# Patient Record
Sex: MALE | Race: WHITE | Employment: STUDENT | URBAN - NONMETROPOLITAN AREA
[De-identification: names, ages, dates, MRNs, and addresses within clinical notes are randomized per-mention and may not be internally consistent; named-entity substitution may affect disease eponyms.]

---
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
## 2022-07-20 ENCOUNTER — OFFICE VISIT (OUTPATIENT)
Dept: FAMILY MEDICINE CLINIC | Age: 22
End: 2022-07-20
Payer: COMMERCIAL

## 2022-07-20 VITALS
SYSTOLIC BLOOD PRESSURE: 124 MMHG | OXYGEN SATURATION: 96 % | HEIGHT: 74 IN | TEMPERATURE: 98.1 F | WEIGHT: 160.8 LBS | BODY MASS INDEX: 20.64 KG/M2 | DIASTOLIC BLOOD PRESSURE: 62 MMHG | RESPIRATION RATE: 18 BRPM | HEART RATE: 59 BPM

## 2022-07-20 DIAGNOSIS — F41.1 GAD (GENERALIZED ANXIETY DISORDER): Primary | ICD-10-CM

## 2022-07-20 DIAGNOSIS — R45.82 FEELING WORRIED: ICD-10-CM

## 2022-07-20 DIAGNOSIS — F34.1 DYSTHYMIA: ICD-10-CM

## 2022-07-20 PROCEDURE — 99214 OFFICE O/P EST MOD 30 MIN: CPT | Performed by: NURSE PRACTITIONER

## 2022-07-20 RX ORDER — PAROXETINE 10 MG/1
10 TABLET, FILM COATED ORAL DAILY
Qty: 30 TABLET | Refills: 3 | Status: SHIPPED | OUTPATIENT
Start: 2022-07-20 | End: 2022-08-17 | Stop reason: SDUPTHER

## 2022-07-20 ASSESSMENT — PATIENT HEALTH QUESTIONNAIRE - PHQ9
SUM OF ALL RESPONSES TO PHQ QUESTIONS 1-9: 11
5. POOR APPETITE OR OVEREATING: 3
2. FEELING DOWN, DEPRESSED OR HOPELESS: 0
SUM OF ALL RESPONSES TO PHQ QUESTIONS 1-9: 11
SUM OF ALL RESPONSES TO PHQ QUESTIONS 1-9: 11
8. MOVING OR SPEAKING SO SLOWLY THAT OTHER PEOPLE COULD HAVE NOTICED. OR THE OPPOSITE, BEING SO FIGETY OR RESTLESS THAT YOU HAVE BEEN MOVING AROUND A LOT MORE THAN USUAL: 0
10. IF YOU CHECKED OFF ANY PROBLEMS, HOW DIFFICULT HAVE THESE PROBLEMS MADE IT FOR YOU TO DO YOUR WORK, TAKE CARE OF THINGS AT HOME, OR GET ALONG WITH OTHER PEOPLE: 2
1. LITTLE INTEREST OR PLEASURE IN DOING THINGS: 3
6. FEELING BAD ABOUT YOURSELF - OR THAT YOU ARE A FAILURE OR HAVE LET YOURSELF OR YOUR FAMILY DOWN: 1
9. THOUGHTS THAT YOU WOULD BE BETTER OFF DEAD, OR OF HURTING YOURSELF: 0
4. FEELING TIRED OR HAVING LITTLE ENERGY: 2
3. TROUBLE FALLING OR STAYING ASLEEP: 2
7. TROUBLE CONCENTRATING ON THINGS, SUCH AS READING THE NEWSPAPER OR WATCHING TELEVISION: 0
SUM OF ALL RESPONSES TO PHQ9 QUESTIONS 1 & 2: 3
SUM OF ALL RESPONSES TO PHQ QUESTIONS 1-9: 11

## 2022-07-20 NOTE — PROGRESS NOTES
Dee Dee Murillo is a 23 y.o. male that presents for Follow-up (Uneasy feeling, emotional, trouble sleeping)      Anxiety     HPI:    Inciting events or triggers for anxiety - daily life events    Frequency of anxiety - daily  Panic attacks? No  Symptoms of panic attacks -   n/a  Sleep Disturbances? Yes - when he feels really worried   Impaired concentration? Yes - some days when anxiety is high  Substance abuse? No  Suicidal/Homicidal Ideation? No    Compliant with meds: has never been on  meds before   Med side effects: No    Sees therapist?: No  Family History of Mental Illness? No    Depressed Mood    HPI:    Depressed Mood? yes - he describes his mood as feeling worried a lot, thinks about things that may go wrong,   Anhedonia? yes - some days   Appetite changes?  no  Sleep disturbances? yes - as above when he is really worrried, his mother notices he has changed not himself not having fun and enjoying things like he used to. Feelings of guilt? yes -   Decreased energy? yes - some days   Impaired concentration? no  Substance abuse? no    Suicidal/Homicidal Ideation? no      Compliant with meds: Yes has never been on meds before   Med side effects: no   Sees therapist?:  no  Family History of Mental Illness?  no    Review of Systems - Psychological ROS: positive for - anxiety, concentration difficulties, depression, and obsessive thoughts, negative for - suicidal ideation     Physical Exam    /62   Pulse 59   Temp 98.1 °F (36.7 °C)   Resp 18   Ht 6' 1.5\" (1.867 m)   Wt 160 lb 12.8 oz (72.9 kg)   SpO2 96%   BMI 20.93 kg/m²   Appearance: alert, well appearing, and in no distress, normal appearing weight and well hydrated. Skin exam - normal coloration and turgor, no rashes, no suspicious skin lesions noted. Psych:  Affect appropriate. Thought process is normal without evidence of depression or psychosis. Good insight and appropriae interaction.   Cognition and memory appear to be intact. ASSESSMENT & PLAN  George Bender was seen today for follow-up. Diagnoses and all orders for this visit:    BERNARDO (generalized anxiety disorder)  Start paxil  Pt declines counseling  Long discussion spent with pt over 50% of visit time, which was 25 minutes   Feeling worried  As above   Dysthymia    Other orders  -     PARoxetine (PAXIL) 10 MG tablet; Take 1 tablet by mouth in the morning. Pt in agreement with plan     Return in about 4 weeks (around 8/17/2022) for f/u new med.

## 2022-08-17 ENCOUNTER — OFFICE VISIT (OUTPATIENT)
Dept: FAMILY MEDICINE CLINIC | Age: 22
End: 2022-08-17
Payer: COMMERCIAL

## 2022-08-17 VITALS
WEIGHT: 154.2 LBS | SYSTOLIC BLOOD PRESSURE: 118 MMHG | OXYGEN SATURATION: 99 % | TEMPERATURE: 98.4 F | HEIGHT: 74 IN | DIASTOLIC BLOOD PRESSURE: 68 MMHG | RESPIRATION RATE: 16 BRPM | HEART RATE: 56 BPM | BODY MASS INDEX: 19.79 KG/M2

## 2022-08-17 DIAGNOSIS — F51.04 PSYCHOPHYSIOLOGICAL INSOMNIA: ICD-10-CM

## 2022-08-17 DIAGNOSIS — F41.1 GAD (GENERALIZED ANXIETY DISORDER): Primary | ICD-10-CM

## 2022-08-17 DIAGNOSIS — Z11.59 ENCOUNTER FOR HEPATITIS C SCREENING TEST FOR LOW RISK PATIENT: ICD-10-CM

## 2022-08-17 DIAGNOSIS — Z11.4 SCREENING FOR HIV (HUMAN IMMUNODEFICIENCY VIRUS): ICD-10-CM

## 2022-08-17 PROCEDURE — 99213 OFFICE O/P EST LOW 20 MIN: CPT | Performed by: NURSE PRACTITIONER

## 2022-08-17 RX ORDER — PAROXETINE HYDROCHLORIDE 20 MG/1
20 TABLET, FILM COATED ORAL DAILY
Qty: 30 TABLET | Refills: 2 | Status: SHIPPED | OUTPATIENT
Start: 2022-08-17

## 2022-08-17 ASSESSMENT — PATIENT HEALTH QUESTIONNAIRE - PHQ9
1. LITTLE INTEREST OR PLEASURE IN DOING THINGS: 0
2. FEELING DOWN, DEPRESSED OR HOPELESS: 1
SUM OF ALL RESPONSES TO PHQ9 QUESTIONS 1 & 2: 1
SUM OF ALL RESPONSES TO PHQ QUESTIONS 1-9: 1

## 2022-08-17 NOTE — PROGRESS NOTES
Usha Ingram is a 23 y.o. male that presents for Medication Check (Paxil is working. \"Mind racing\" at night still)    Pt her for 1 month f/u HPI from previous visit July 2022. Pt states he is dong much better. States his anxiety has lessened , he feels happier, less stressed. Not having panic attacks. He does still worry a lot at night, has trouble shutting his mind down, especially if he has a bad day, his mind will og ov er the bad events of the day over and over causing him trouble falling asleep. He has tried melatonin and it made it worse. Thinks he is 40% better. He feels less down and sad. Anxiety     HPI:    Inciting events or triggers for anxiety - daily life events    Frequency of anxiety - daily  Panic attacks? No  Symptoms of panic attacks -   n/a  Sleep Disturbances? Yes - when he feels really worried   Impaired concentration? Yes - some days when anxiety is high  Substance abuse? No  Suicidal/Homicidal Ideation? No    Compliant with meds: has never been on  meds before   Med side effects: No    Sees therapist?: No  Family History of Mental Illness? No    Depressed Mood    HPI:    Depressed Mood? yes - he describes his mood as feeling worried a lot, thinks about things that may go wrong,   Anhedonia? yes - some days   Appetite changes?  no  Sleep disturbances? yes - as above when he is really worrried, his mother notices he has changed not himself not having fun and enjoying things like he used to. Feelings of guilt? yes -   Decreased energy? yes - some days   Impaired concentration? no  Substance abuse? no    Suicidal/Homicidal Ideation?  no      Compliant with meds: Yes has never been on meds before   Med side effects: no   Sees therapist?:  no  Family History of Mental Illness?  no    Review of Systems - Psychological ROS: positive for - anxiety, concentration difficulties, depression, and obsessive thoughts, negative for - suicidal ideation     Physical Exam    /68   Pulse 56   Temp 98.4 °F (36.9 °C)   Resp 16   Ht 6' 1.5\" (1.867 m)   Wt 154 lb 3.2 oz (69.9 kg)   SpO2 99%   BMI 20.07 kg/m²   Appearance: alert, well appearing, and in no distress, normal appearing weight and well hydrated. Skin exam - normal coloration and turgor, no rashes, no suspicious skin lesions noted. Psych:  Affect appropriate. Thought process is normal without evidence of depression or psychosis. Good insight and appropriae interaction. Cognition and memory appear to be intact. ASSESSMENT & PLAN  Whitney Brewer was seen today for follow-up. Diagnoses and all orders for this visit:    Encounter Diagnoses   Name Primary? BERNARDO (generalized anxiety disorder) Yes    Psychophysiological insomnia     Screening for HIV (human immunodeficiency virus)     Encounter for hepatitis C screening test for low risk patient     BERNARDO improved, will increase paxil to 20 mg daily. Relaxation tips and techniques discussed with pt. Orders Placed This Encounter   Procedures    HIV Screen    Hepatitis C Antibody    Pt in agreement with plan     Return in about 3 months (around 11/17/2022) for Physical Exam and f/u anxiety.

## 2022-11-28 ENCOUNTER — OFFICE VISIT (OUTPATIENT)
Dept: FAMILY MEDICINE CLINIC | Age: 22
End: 2022-11-28
Payer: COMMERCIAL

## 2022-11-28 VITALS
HEART RATE: 72 BPM | RESPIRATION RATE: 16 BRPM | SYSTOLIC BLOOD PRESSURE: 124 MMHG | BODY MASS INDEX: 23.15 KG/M2 | WEIGHT: 180.4 LBS | OXYGEN SATURATION: 99 % | DIASTOLIC BLOOD PRESSURE: 72 MMHG | TEMPERATURE: 98.1 F | HEIGHT: 74 IN

## 2022-11-28 DIAGNOSIS — H61.23 BILATERAL IMPACTED CERUMEN: ICD-10-CM

## 2022-11-28 DIAGNOSIS — Z00.00 ENCOUNTER FOR WELL ADULT EXAM WITHOUT ABNORMAL FINDINGS: Primary | ICD-10-CM

## 2022-11-28 DIAGNOSIS — J02.9 PHARYNGITIS, UNSPECIFIED ETIOLOGY: ICD-10-CM

## 2022-11-28 DIAGNOSIS — F51.04 PSYCHOPHYSIOLOGICAL INSOMNIA: ICD-10-CM

## 2022-11-28 DIAGNOSIS — F41.1 GAD (GENERALIZED ANXIETY DISORDER): ICD-10-CM

## 2022-11-28 LAB — STREPTOCOCCUS A RNA: NEGATIVE

## 2022-11-28 PROCEDURE — 87651 STREP A DNA AMP PROBE: CPT | Performed by: NURSE PRACTITIONER

## 2022-11-28 PROCEDURE — 99213 OFFICE O/P EST LOW 20 MIN: CPT | Performed by: NURSE PRACTITIONER

## 2022-11-28 PROCEDURE — 69209 REMOVE IMPACTED EAR WAX UNI: CPT | Performed by: NURSE PRACTITIONER

## 2022-11-28 PROCEDURE — 99395 PREV VISIT EST AGE 18-39: CPT | Performed by: NURSE PRACTITIONER

## 2022-11-28 RX ORDER — PAROXETINE HYDROCHLORIDE 20 MG/1
20 TABLET, FILM COATED ORAL DAILY
Qty: 90 TABLET | Refills: 4 | Status: SHIPPED | OUTPATIENT
Start: 2022-11-28

## 2022-11-28 ASSESSMENT — PATIENT HEALTH QUESTIONNAIRE - PHQ9
1. LITTLE INTEREST OR PLEASURE IN DOING THINGS: 0
SUM OF ALL RESPONSES TO PHQ QUESTIONS 1-9: 0
SUM OF ALL RESPONSES TO PHQ QUESTIONS 1-9: 0
DEPRESSION UNABLE TO ASSESS: FUNCTIONAL CAPACITY MOTIVATION LIMITS ACCURACY
SUM OF ALL RESPONSES TO PHQ QUESTIONS 1-9: 0
SUM OF ALL RESPONSES TO PHQ QUESTIONS 1-9: 0

## 2022-11-28 ASSESSMENT — ENCOUNTER SYMPTOMS
SINUS PAIN: 0
GASTROINTESTINAL NEGATIVE: 1
TROUBLE SWALLOWING: 0
VOICE CHANGE: 0
RHINORRHEA: 0
RESPIRATORY NEGATIVE: 1
SORE THROAT: 1

## 2022-11-28 NOTE — PROGRESS NOTES
Well Adult Note  Name: Chon Morton Date: 2022   MRN: [id number] Sex: Male   Age: 23 y.o. Ethnicity: Non- / Non    : 2000 Race: White (non-)      Xenia Spivey is here for well adult exam.  History:  Pt eats a wide variety of foods. Has gained back the weight he initially lost due to BERNARDO. He is sleeping better at night, and does work out 2-3 times a week. Depressed Mood    HPI:    Depressed Mood? yes - but better since increasing the paxil, he is much happier, more outgoing, did also change jobs. Anhedonia?  no  Appetite changes? yes - appetite has returned and he feels good about this   Sleep disturbances? no  Feelings of guilt? no  Decreased energy? no  Impaired concentration? no  Substance abuse? no    Suicidal/Homicidal Ideation? no      Compliant with meds: Yes  Med side effects: no   Sees therapist?:  no  Family History of Mental Illness? yes - he thinks so but unsure. Also states he has stopped having panic attacks, less anxious, sleeping well. Review of Systems - Psychological ROS: positive for - anxiety, negative for - sleep disturbances or suicidal ideation       Sore Throat    HPI:      Symptoms have been present for 2 day(s). Fever? No  Odynophagia? Yes  Dysphagia? Yes  Cough? No  Rhinitis? No  Hx of EBV? No  Sick Contacts? Unsure was with family at [facility], he gets strep 3-4 times a year     Pt denies SOB, wheezing, stridor, nausea or vomiting. Review of Systems   Constitutional:  Negative for chills, fatigue and fever. HENT:  Positive for hearing loss and sore throat. Negative for ear discharge, ear pain, rhinorrhea, sinus pain, tinnitus, trouble swallowing and voice change. Respiratory: Negative. Cardiovascular: Negative. Gastrointestinal: Negative. Genitourinary: Negative. Pt performs JESS several times a year    Musculoskeletal: Negative. Skin: Negative. Neurological: Negative. Psychiatric/Behavioral:  Negative for decreased concentration, dysphoric mood, self-injury, sleep disturbance and suicidal ideas. The patient is not nervous/anxious. No Known Allergies      Prior to Visit Medications    Medication Sig Taking? Authorizing Provider   PARoxetine (PAXIL) 20 MG tablet Take 1 tablet by mouth daily Yes KENZIE Winkler - CNP       History reviewed. No pertinent past medical history. Past Surgical History:   Procedure Laterality Date    FOOT SURGERY      wart removal    WISDOM TOOTH EXTRACTION  12/2019       History reviewed. No pertinent family history. Social History     Tobacco Use    Smoking status: Never    Smokeless tobacco: Never   Vaping Use    Vaping Use: Some days    Substances: Nicotine, THC, CBD    Devices: Disposable, Pre-filled or refillable cartridge, Pre-filled pod   Substance Use Topics    Alcohol use: Yes     Comment: a little    Drug use: Yes     Types: Marijuana (Weed)       Objective   /72   Pulse 72   Temp 98.1 °F (36.7 °C)   Resp 16   Ht 6' 1.5\" (1.867 m)   Wt 180 lb 6.4 oz (81.8 kg)   SpO2 99%   BMI 23.48 kg/m²   Wt Readings from Last 3 Encounters:   11/28/22 180 lb 6.4 oz (81.8 kg)   08/17/22 154 lb 3.2 oz (69.9 kg)   07/20/22 160 lb 12.8 oz (72.9 kg)     There were no vitals filed for this visit. Physical Exam  Vitals and nursing note reviewed. Constitutional:       Appearance: He is not ill-appearing. HENT:      Head: Normocephalic. Right Ear: There is impacted cerumen. Left Ear: There is impacted cerumen. Ears:      Comments: Bilateral impaction cleared after irrigation. Nose: Nose normal.      Mouth/Throat:      Mouth: Mucous membranes are moist.      Pharynx: Posterior oropharyngeal erythema present. No pharyngeal swelling, oropharyngeal exudate or uvula swelling. Tonsils: No tonsillar exudate or tonsillar abscesses. 1+ on the right. 1+ on the left.       Comments: Bilateral cervical lymphadenopathy Cardiovascular:      Rate and Rhythm: Normal rate and regular rhythm. Pulses: Normal pulses. Heart sounds: Normal heart sounds. No murmur heard. Pulmonary:      Effort: Pulmonary effort is normal. No respiratory distress. Breath sounds: Normal breath sounds. No wheezing. Abdominal:      General: Abdomen is flat. Bowel sounds are normal. There is no distension. Palpations: Abdomen is soft. Tenderness: There is no abdominal tenderness. Skin:     General: Skin is warm and dry. Capillary Refill: Capillary refill takes less than 2 seconds. Neurological:      General: No focal deficit present. Mental Status: He is alert and oriented to person, place, and time. Psychiatric:         Mood and Affect: Mood normal.         Behavior: Behavior normal.         Thought Content: Thought content normal.         Judgment: Judgment normal.         Assessment   Plan        Encounter Diagnoses   Name Primary? Encounter for well adult exam without abnormal findings Yes    BERNARDO (generalized anxiety disorder)     Psychophysiological insomnia     Pharyngitis, unspecified etiology     Bilateral impacted cerumen     BERNARDO and depression improved, continue current meds    Cerumen impaction cleared use debrox monthly to prevent wax build up   . Pt wears ear plugs which likely leads to build up     Strep negative,warm salt water gargles, will send off for culture tylenol for pain    Call for any worsening symptoms. Orders Placed This Encounter   Procedures    Culture, Throat    CBC with Auto Differential    Basic Metabolic Panel    TSH With Reflex Ft4    POCT Rapid Strep A DNA (Alere i)    NJ REMOVAL IMPACTED CERUMEN IRRIGATION/LVG UNILAT        Personalized Preventive Plan   Current Health Maintenance Status    There is no immunization history on file for this patient.      Health Maintenance   Topic Date Due    COVID-19 Vaccine (1) Never done    HIV screen  Never done    Hepatitis C screen  Never done    Flu vaccine (1) Never done    DTaP/Tdap/Td vaccine (7 - Td or Tdap) 05/08/2023    Depression Monitoring  08/17/2023    Hepatitis A vaccine  Completed    Hib vaccine  Completed    HPV vaccine  Completed    Varicella vaccine  Completed    Meningococcal (ACWY) vaccine  Completed    Pneumococcal 0-64 years Vaccine  Aged Out     Recommendations for Preventive Services Due: see orders and patient instructions/AVS.    Return in about 1 year (around 11/28/2023) for Physical Exam .

## 2022-11-28 NOTE — PATIENT INSTRUCTIONS
Debrox drops , one drop three days a month to prevent wax build up      Well Visit, Ages 25 to 72: Care Instructions  Well visits can help you stay healthy. Your doctor has checked your overall health and may have suggested ways to take good care of yourself. Your doctor also may have recommended tests. You can help prevent illness with healthy eating, good sleep, vaccinations, regular exercise, and other steps. Get the tests that you and your doctor decide on. Depending on your age and risks, examples might include screening for diabetes; hepatitis C; HIV; and cervical, breast, lung, and colon cancer. Screening helps find diseases before any symptoms appear. Eat healthy foods. Choose fruits, vegetables, whole grains, lean protein, and low-fat dairy foods. Limit saturated fat and reduce salt. Limit alcohol. Men should have no more than 2 drinks a day. Women should have no more than 1. For some people, no alcohol is the best choice. Exercise. Get at least 30 minutes of exercise on most days of the week. Walking can be a good choice. Reach and stay at your healthy weight. This will lower your risk for many health problems. Take care of your mental health. Try to stay connected with friends, family, and community, and find ways to manage stress. If you're feeling depressed or hopeless, talk to someone. A counselor can help. If you don't have a counselor, talk to your doctor. Talk to your doctor if you think you may have a problem with alcohol or drug use. This includes prescription medicines and illegal drugs. Avoid tobacco and nicotine: Don't smoke, vape, or chew. If you need help quitting, talk to your doctor. Practice safer sex. Getting tested, using condoms or dental dams, and limiting sex partners can help prevent STIs. Use birth control if it's important to you to prevent pregnancy. Talk with your doctor about your choices and what might be best for you. Prevent problems where you can.  Protect your skin from too much sun, wash your hands, brush your teeth twice a day, and wear a seat belt in the car. Where can you learn more? Go to https://[employer]User. org and sign in to your [facility] account. Enter P072 in the [facility] box to learn more about \"Well Visit, Ages 25 to 72: Care Instructions. \"     If you do not have an account, please click on the \"Sign Up Now\" link. Current as of: March 9, 2022               Content Version: 13.4  © 4917-4634 [employer]. Care instructions adapted under license by [facility] [place] ([facility]). If you have questions about a medical condition or this instruction, always ask your healthcare professional. [address] any warranty or liability for your use of this information.

## 2022-12-01 ENCOUNTER — TELEPHONE (OUTPATIENT)
Dept: FAMILY MEDICINE CLINIC | Age: 22
End: 2022-12-01

## 2022-12-01 LAB
ORGANISM: ABNORMAL
THROAT/NOSE CULTURE: ABNORMAL

## 2022-12-01 RX ORDER — PENICILLIN V POTASSIUM 500 MG/1
500 TABLET ORAL 4 TIMES DAILY
Qty: 40 TABLET | Refills: 0 | Status: SHIPPED | OUTPATIENT
Start: 2022-12-01 | End: 2022-12-11

## 2022-12-01 NOTE — TELEPHONE ENCOUNTER
Pt informed and understanding     Pt states \"my throat does not feel terrible.  I have been gargling salt water, I looked and it is just red and swollen\"

## 2022-12-01 NOTE — TELEPHONE ENCOUNTER
----- Message from KENZIE Winkler - CNP sent at 12/1/2022 11:12 AM EST -----  Let pt know his final throat culture did come back positive for strep I am going to place him on PCN for this, ask how his throat has been feeling.

## 2023-03-14 DIAGNOSIS — F41.1 GAD (GENERALIZED ANXIETY DISORDER): ICD-10-CM

## 2023-03-14 DIAGNOSIS — F51.04 PSYCHOPHYSIOLOGICAL INSOMNIA: ICD-10-CM

## 2023-03-14 RX ORDER — PAROXETINE 10 MG/1
10 TABLET, FILM COATED ORAL DAILY
Qty: 90 TABLET | Refills: 1 | Status: SHIPPED | OUTPATIENT
Start: 2023-03-14